# Patient Record
Sex: MALE | Race: BLACK OR AFRICAN AMERICAN | NOT HISPANIC OR LATINO | Employment: FULL TIME | URBAN - METROPOLITAN AREA
[De-identification: names, ages, dates, MRNs, and addresses within clinical notes are randomized per-mention and may not be internally consistent; named-entity substitution may affect disease eponyms.]

---

## 2019-04-08 ENCOUNTER — OFFICE VISIT (OUTPATIENT)
Dept: URGENT CARE | Facility: CLINIC | Age: 29
End: 2019-04-08
Payer: COMMERCIAL

## 2019-04-08 VITALS
WEIGHT: 280 LBS | OXYGEN SATURATION: 97 % | RESPIRATION RATE: 18 BRPM | DIASTOLIC BLOOD PRESSURE: 77 MMHG | SYSTOLIC BLOOD PRESSURE: 122 MMHG | TEMPERATURE: 99 F | BODY MASS INDEX: 41.47 KG/M2 | HEIGHT: 69 IN | HEART RATE: 71 BPM

## 2019-04-08 DIAGNOSIS — M54.2 NECK PAIN: ICD-10-CM

## 2019-04-08 DIAGNOSIS — S13.9XXA NECK SPRAIN, INITIAL ENCOUNTER: ICD-10-CM
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
## 2019-04-08 PROCEDURE — 99204 OFFICE O/P NEW MOD 45 MIN: CPT | Mod: 25,S$GLB,, | Performed by: FAMILY MEDICINE

## 2019-04-08 PROCEDURE — 96372 THER/PROPH/DIAG INJ SC/IM: CPT | Mod: S$GLB,,, | Performed by: FAMILY MEDICINE

## 2019-04-08 PROCEDURE — 99204 PR OFFICE/OUTPT VISIT, NEW, LEVL IV, 45-59 MIN: ICD-10-PCS | Mod: 25,S$GLB,, | Performed by: FAMILY MEDICINE

## 2019-04-08 PROCEDURE — 96372 PR INJECTION,THERAP/PROPH/DIAG2ST, IM OR SUBCUT: ICD-10-PCS | Mod: S$GLB,,, | Performed by: FAMILY MEDICINE

## 2019-04-08 PROCEDURE — 72040 X-RAY EXAM NECK SPINE 2-3 VW: CPT | Mod: S$GLB,,, | Performed by: RADIOLOGY

## 2019-04-08 PROCEDURE — 72040 XR CERVICAL SPINE 2 OR 3 VIEWS: ICD-10-PCS | Mod: S$GLB,,, | Performed by: RADIOLOGY

## 2019-04-08 RX ORDER — TRAMADOL HYDROCHLORIDE 50 MG/1
50 TABLET ORAL EVERY 6 HOURS PRN
Qty: 20 TABLET | Refills: 0 | Status: SHIPPED | OUTPATIENT
Start: 2019-04-08 | End: 2020-04-07

## 2019-04-08 RX ORDER — KETOROLAC TROMETHAMINE 30 MG/ML
30 INJECTION, SOLUTION INTRAMUSCULAR; INTRAVENOUS ONCE
Status: COMPLETED | OUTPATIENT
Start: 2019-04-08 | End: 2019-04-08

## 2019-04-08 RX ORDER — CHLORZOXAZONE 500 MG/1
500 TABLET ORAL 4 TIMES DAILY PRN
Qty: 40 TABLET | Refills: 0 | Status: SHIPPED | OUTPATIENT
Start: 2019-04-08 | End: 2019-04-18

## 2019-04-08 RX ORDER — NAPROXEN 500 MG/1
500 TABLET ORAL 2 TIMES DAILY WITH MEALS
Qty: 20 TABLET | Refills: 0 | Status: SHIPPED | OUTPATIENT
Start: 2019-04-08 | End: 2019-05-29 | Stop reason: ALTCHOICE

## 2019-04-08 RX ADMIN — KETOROLAC TROMETHAMINE 30 MG: 30 INJECTION, SOLUTION INTRAMUSCULAR; INTRAVENOUS at 05:04
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
## 2019-04-08 NOTE — LETTER
April 8, 2019  Woody Watson  [address] [address]                [facility]  [address], [address]  [place] [address]  Phone: [phone]  Fax: [phone] Woody Watson was seen and treated in our Urgent Care department   on 4/8/2019. He may return to work in 2 - 3 days.      If you have any questions or concerns, please don't hesitate to call.    Sincerely,        Rod Patten MD

## 2019-05-29 ENCOUNTER — OFFICE VISIT (OUTPATIENT)
Dept: URGENT CARE | Facility: CLINIC | Age: 29
End: 2019-05-29
Payer: COMMERCIAL

## 2019-05-29 VITALS
HEART RATE: 80 BPM | WEIGHT: 260 LBS | OXYGEN SATURATION: 97 % | TEMPERATURE: 99 F | SYSTOLIC BLOOD PRESSURE: 134 MMHG | RESPIRATION RATE: 20 BRPM | HEIGHT: 69 IN | DIASTOLIC BLOOD PRESSURE: 63 MMHG | BODY MASS INDEX: 38.51 KG/M2

## 2019-05-29 DIAGNOSIS — M79.671 FOOT PAIN, RIGHT: ICD-10-CM

## 2019-05-29 DIAGNOSIS — J01.90 ACUTE SINUSITIS, RECURRENCE NOT SPECIFIED, UNSPECIFIED LOCATION: Primary | ICD-10-CM

## 2019-05-29 DIAGNOSIS — M25.512 LEFT SHOULDER PAIN, UNSPECIFIED CHRONICITY: ICD-10-CM

## 2019-05-29 PROCEDURE — 73030 X-RAY EXAM OF SHOULDER: CPT | Mod: LT,S$GLB,, | Performed by: RADIOLOGY

## 2019-05-29 PROCEDURE — 73630 X-RAY EXAM OF FOOT: CPT | Mod: RT,S$GLB,, | Performed by: RADIOLOGY

## 2019-05-29 PROCEDURE — 96372 PR INJECTION,THERAP/PROPH/DIAG2ST, IM OR SUBCUT: ICD-10-PCS | Mod: S$GLB,,, | Performed by: PHYSICIAN ASSISTANT

## 2019-05-29 PROCEDURE — 96372 THER/PROPH/DIAG INJ SC/IM: CPT | Mod: S$GLB,,, | Performed by: PHYSICIAN ASSISTANT

## 2019-05-29 PROCEDURE — 3008F PR BODY MASS INDEX (BMI) DOCUMENTED: ICD-10-PCS | Mod: CPTII,S$GLB,, | Performed by: PHYSICIAN ASSISTANT

## 2019-05-29 PROCEDURE — 3008F BODY MASS INDEX DOCD: CPT | Mod: CPTII,S$GLB,, | Performed by: PHYSICIAN ASSISTANT

## 2019-05-29 PROCEDURE — 73630 XR FOOT COMPLETE 3 VIEW RIGHT: ICD-10-PCS | Mod: RT,S$GLB,, | Performed by: RADIOLOGY

## 2019-05-29 PROCEDURE — 99214 PR OFFICE/OUTPT VISIT, EST, LEVL IV, 30-39 MIN: ICD-10-PCS | Mod: 25,S$GLB,, | Performed by: PHYSICIAN ASSISTANT

## 2019-05-29 PROCEDURE — 99214 OFFICE O/P EST MOD 30 MIN: CPT | Mod: 25,S$GLB,, | Performed by: PHYSICIAN ASSISTANT

## 2019-05-29 PROCEDURE — 73030 XR SHOULDER COMPLETE 2 OR MORE VIEWS LEFT: ICD-10-PCS | Mod: LT,S$GLB,, | Performed by: RADIOLOGY

## 2019-05-29 RX ORDER — DEXAMETHASONE SODIUM PHOSPHATE 100 MG/10ML
5 INJECTION INTRAMUSCULAR; INTRAVENOUS
Status: COMPLETED | OUTPATIENT
Start: 2019-05-29 | End: 2019-05-29

## 2019-05-29 RX ORDER — AZITHROMYCIN 250 MG/1
TABLET, FILM COATED ORAL
Qty: 6 TABLET | Refills: 0 | Status: SHIPPED | OUTPATIENT
Start: 2019-05-29

## 2019-05-29 RX ORDER — ETODOLAC 400 MG/1
400 TABLET, FILM COATED ORAL 2 TIMES DAILY
Qty: 30 TABLET | Refills: 0 | Status: SHIPPED | OUTPATIENT
Start: 2019-05-29 | End: 2019-06-13

## 2019-05-29 RX ORDER — METHOCARBAMOL 500 MG/1
500 TABLET, FILM COATED ORAL 3 TIMES DAILY
Qty: 30 TABLET | Refills: 0 | Status: SHIPPED | OUTPATIENT
Start: 2019-05-29 | End: 2019-06-08

## 2019-05-29 RX ADMIN — DEXAMETHASONE SODIUM PHOSPHATE 5 MG: 100 INJECTION INTRAMUSCULAR; INTRAVENOUS at 03:05

## 2019-05-29 NOTE — LETTER
May 29, 2019      [facility]  [address], [address]  [place] [address]  Phone: [phone]  Fax: [phone]       Patient: Woody Watson   YOB: 1990  Date of Visit: 05/29/2019    To Whom It May Concern:    Raymond Watson  was at [facility] on 05/29/2019. He may return to work/school on 5/30/2019 with no restrictions. If you have any questions or concerns, or if I can be of further assistance, please do not hesitate to contact me.    Sincerely,    Eben Glaser PA-C

## 2019-05-29 NOTE — PROGRESS NOTES
"Subjective:       Patient ID: Woody Watson is a 28 y.o. male.    Vitals:  height is 5' 9" (1.753 m) and weight is 117.9 kg (260 lb). His oral temperature is 98.5 °F (36.9 °C). His blood pressure is 134/63 and his pulse is 80. His respiration is 20 and oxygen saturation is 97%.     Chief Complaint: Sinus Problem; Foot Pain (right); and Shoulder Pain (left)    Sinus Problem   This is a new problem. The current episode started in the past 7 days (x3days). The problem has been gradually worsening since onset. There has been no fever. Associated symptoms include congestion, coughing, sinus pressure and a sore throat. Pertinent negatives include no chills, diaphoresis, ear pain, headaches or shortness of breath. Past treatments include antibiotics. The treatment provided mild relief.   Foot Injury    Incident onset: months. The incident occurred at home. There was no injury mechanism. The pain is present in the right foot. The quality of the pain is described as aching. The pain is at a severity of 5/10. The pain is moderate. The pain has been constant since onset. Pertinent negatives include no inability to bear weight, numbness or tingling. He reports no foreign bodies present. The symptoms are aggravated by movement and weight bearing. He has tried elevation, ice, rest and heat for the symptoms. The treatment provided mild relief.   Shoulder Pain    The pain is present in the left shoulder. This is a new problem. The current episode started 1 to 4 weeks ago. There has been no history of extremity trauma. The problem occurs intermittently. The problem has been unchanged. The quality of the pain is described as aching. The pain is at a severity of 3/10. The pain is mild. Pertinent negatives include no fever, headaches, inability to bear weight, numbness or tingling. He has tried NSAIDS for the symptoms. The treatment provided mild relief.       Constitution: Negative for chills, sweating, fatigue and fever.   HENT: " Positive for congestion, sinus pressure and sore throat. Negative for ear pain, sinus pain and voice change.    Neck: Negative for painful lymph nodes.   Eyes: Negative for eye redness.   Respiratory: Positive for cough. Negative for chest tightness, sputum production, bloody sputum, COPD, shortness of breath, stridor, wheezing and asthma.    Gastrointestinal: Negative for nausea and vomiting.   Musculoskeletal: Positive for pain and joint pain. Negative for muscle ache.   Skin: Negative for rash.   Allergic/Immunologic: Negative for seasonal allergies and asthma.   Neurological: Negative for headaches and numbness.   Hematologic/Lymphatic: Negative for swollen lymph nodes.       Objective:      Physical Exam   Constitutional: He is oriented to person, place, and time. Vital signs are normal. He appears well-developed and well-nourished. He is cooperative.  Non-toxic appearance. He does not have a sickly appearance. He does not appear ill. No distress.   HENT:   Head: Normocephalic and atraumatic.   Right Ear: Hearing, external ear and ear canal normal. Tympanic membrane is not erythematous and not bulging. A middle ear effusion (serous) is present.   Left Ear: Hearing, external ear and ear canal normal. Tympanic membrane is not erythematous and not bulging. A middle ear effusion (serous) is present.   Nose: Mucosal edema present. No rhinorrhea or nasal deformity. No epistaxis. Right sinus exhibits no maxillary sinus tenderness and no frontal sinus tenderness. Left sinus exhibits no maxillary sinus tenderness and no frontal sinus tenderness.   Mouth/Throat: Uvula is midline and mucous membranes are normal. No trismus in the jaw. Normal dentition. No uvula swelling. Posterior oropharyngeal erythema (mild) present. No oropharyngeal exudate or posterior oropharyngeal edema.       Eyes: Pupils are equal, round, and reactive to light. Conjunctivae, EOM and lids are normal. No scleral icterus.   Sclera clear bilat   Neck:  Trachea normal, full passive range of motion without pain and phonation normal. Neck supple. No neck rigidity. No edema and no erythema present.   Cardiovascular: Normal rate, regular rhythm, normal heart sounds, intact distal pulses and normal pulses.   Pulmonary/Chest: Effort normal and breath sounds normal. No respiratory distress. He has no decreased breath sounds. He has no wheezes. He has no rhonchi. He has no rales.   Intermittent nonproductive cough   Abdominal: Soft. Normal appearance and bowel sounds are normal. He exhibits no distension. There is no tenderness.   Musculoskeletal: Normal range of motion. He exhibits no edema or deformity.        Left shoulder: He exhibits tenderness and pain. He exhibits normal range of motion, no swelling, no effusion, no crepitus, no deformity, no laceration and normal pulse.        Arms:       Left foot: There is tenderness. There is normal range of motion, no swelling, normal capillary refill, no crepitus and no deformity.        Feet:    Lymphadenopathy:     He has no cervical adenopathy.   Neurological: He is alert and oriented to person, place, and time. He has normal strength. No sensory deficit. He exhibits normal muscle tone. Coordination normal.   Skin: Skin is warm, dry and intact. He is not diaphoretic. No pallor.   Psychiatric: He has a normal mood and affect. His speech is normal and behavior is normal. Judgment and thought content normal. Cognition and memory are normal.   Nursing note and vitals reviewed.      X-ray Foot Complete Right    Result Date: 5/29/2019  EXAMINATION: XR FOOT COMPLETE 3 VIEW RIGHT CLINICAL HISTORY: . Pain in right foot TECHNIQUE: AP, lateral, and oblique views of the right foot were performed. COMPARISON: None FINDINGS: No fracture or dislocation.  No bone destruction identified     See above Electronically signed by: Gordy Nascimento MD Date:    05/29/2019 Time:    14:22    Xr Shoulder Complete 2 Or More Views Left    Result Date:  5/29/2019  EXAMINATION: XR SHOULDER COMPLETE 2 OR MORE VIEWS LEFT CLINICAL HISTORY: Pain in left shoulder TECHNIQUE: Two or three views of the left shoulder were performed. COMPARISON: None FINDINGS: No fracture or dislocation.  No bone destruction identified     See above Electronically signed by: Gordy Nascimento MD Date:    05/29/2019 Time:    14:21      Assessment:       1. Acute sinusitis, recurrence not specified, unspecified location    2. Foot pain, right    3. Left shoulder pain, unspecified chronicity        Plan:         Acute sinusitis, recurrence not specified, unspecified location  -     azithromycin (Z-LATASHA) 250 MG tablet; Take 2 tablets by mouth on day 1; Take 1 tablet by mouth on days 2-5  Dispense: 6 tablet; Refill: 0  -     dexamethasone injection 5 mg  -     pseudoephedrine-DM-guaifenesin 60- mg Tab; Take 1 tablet by mouth every 6 (six) hours as needed.  Dispense: 28 tablet; Refill: 0    Foot pain, right  -     X-Ray Foot Complete Right; Future; Expected date: 05/29/2019  -     etodolac (LODINE) 400 MG tablet; Take 1 tablet (400 mg total) by mouth 2 (two) times daily. for 15 days  Dispense: 30 tablet; Refill: 0    Left shoulder pain, unspecified chronicity  -     XR SHOULDER COMPLETE 2 OR MORE VIEWS LEFT; Future; Expected date: 05/29/2019  -     etodolac (LODINE) 400 MG tablet; Take 1 tablet (400 mg total) by mouth 2 (two) times daily. for 15 days  Dispense: 30 tablet; Refill: 0  -     methocarbamol (ROBAXIN) 500 MG Tab; Take 1 tablet (500 mg total) by mouth 3 (three) times daily. for 10 days  Dispense: 30 tablet; Refill: 0      Patient Instructions   · Follow up with your primary care in 2-5 days if symptoms have not improved, or you may return here.  · If you were referred to a specialist, please follow up with that specialty.  · If you were prescribed antibiotics, please take them to completion.  · If you were prescribed a narcotic or any medication with sedative effects, do not drive or  operate heavy equipment or machinery while taking these medications.  · You must understand that you have received treatment at an Urgent Care facility only, and that you may be released before all of your medical problems are known or treated. Urgent Care facilities are not equipped to handle life threatening emergencies. It is recommended that you go to an Emergency Department for further evaluation of worsening or concerning symptoms, or possibly life threatening conditions as discussed.                                        If you  smoke, please stop smoking      You have been given a prescription for antibiotics (AZITHROMYCIN). Your symptoms will likely resolve without antibiotics. It is recommended that you monitor yourself closely for 3-5 days and fill this prescription and start the antibiotic only if signs of infection, as discussed at your visit, are present. It is important to follow these instructions due to the problem of increasing antibiotic resistance.    Symptomatic treatment:    Alternate tylenol and motrin every 4-6 hours  salt water gargles  Cold-eeze helps to reduce the duration of sore throat symptoms  Cepachol helps to numb the discomfort  Chloroseptic spray  Nasal saline spray reduces inflammation and dryness  Warm face compresses as often as you can  Vicks vapor rub at night  Flonase OTC or Nasacort OTC  Simple foods like chicken noodle soup help  Pedialyte helps with dehydration if lacking appetite  Rest as much as you can        Sinusitis    The sinuses are air-filled spaces within the bones of the face. They connect to the inside of the nose. Sinusitis is an inflammation of the tissue lining the sinus cavity. Sinus inflammation can occur during a cold. It can also be due to allergies to pollens and other particles in the air. It can cause symptoms such as sinus congestion, headache, sore throat, facial swelling and fullness. It may also cause a low-grade fever. No infection is present,  and no antibiotic treatment is needed.  Home care  · Drink plenty of water, hot tea, and other liquids. This may help thin mucus. It also may promote sinus drainage.  · Heat may help soothe painful areas of the face. Use a towel soaked in hot water. Or,  the shower and direct the hot spray onto your face. Using a vaporizer along with a menthol rub at night may also help.   · An expectorant containing guaifenesin may help thin the mucus and promote drainage from the sinuses.  · Over-the-counter decongestants may be used unless a similar medicine was prescribed. Nasal sprays work the fastest. Use one that contains phenylephrine or oxymetazoline. First blow the nose gently. Then use the spray. Do not use these medicines more often than directed on the label or symptoms may get worse. You may also use tablets containing pseudoephedrine. Avoid products that combine ingredients, because side effects may be increased. Read labels. You can also ask the pharmacist for help. (NOTE: Persons with high blood pressure should not use decongestants. They can raise blood pressure.)  · Over-the-counter antihistamines may help if allergies contributed to your sinusitis.    · Use acetaminophen or ibuprofen to control pain, unless another pain medicine was prescribed. (If you have chronic liver or kidney disease or ever had a stomach ulcer, talk with your doctor before using these medicines. Aspirin should never be used in anyone under 18 years of age who is ill with a fever. It may cause severe liver damage.)  · Use nasal rinses or irrigation as instructed by your health care provider.  · Don't smoke. This can worsen symptoms.  Follow-up care  Follow up with your healthcare provider or our staff if you are not improving within the next week.  When to seek medical advice  Call your healthcare provider if any of these occur:  · Green or yellow discharge from the nose or into the throat  · Facial pain or headache becoming more  severe  · Stiff neck  · Unusual drowsiness or confusion  · Swelling of the forehead or eyelids  · Vision problems, including blurred or double vision  · Fever of 100.4ºF (38ºC) or higher, or as directed by your healthcare provider  · Seizure  · Breathing problems  · Symptoms not resolving within 10 days  Date Last Reviewed: 4/13/2015 © 2000-2017 [employer]. [address] [place] [address], [place] [address]. All rights reserved. This information is not intended as a substitute for professional medical care. Always follow your healthcare professional's instructions.        Tendonitis  A tendon is the thick fibrous cord that joins muscle to bone and allows joints to move. When a tendon becomes inflamed, it is called tendonitis. This can occur from overuse, injury, or infection. This usually involves the shoulders, forearm, wrist, hands and foot. Symptoms include pain, swelling and tenderness to the touch. Moving the joint increases the pain.  It takes 4 to 6 weeks for tendonitis to heal. It is treated by preventing motion of the tendon with a splint or brace and the use of anti-inflammatory medicine.  Home care  · Some people find relief with ice packs. These can be crushed or cubed ice in a plastic bag or a bag of frozen vegetables wrapped in a thin towel. Other people get better relief with heat. This can include a hot shower, hot bath, or a moist towel warmed in a microwave. Try each and use the method that feels best, for 15 to 20 minutes several times a day.  · Rest the inflamed joint and protect it from movement.  · You may use over-the-counter ibuprofen or naproxen to treat pain and inflammation, unless another medicine was prescribed. If you can't take these medicines, acetaminophen may help with the pain, but does not treat inflammation. If you have chronic liver or kidney disease or ever had a stomach ulcer or gastrointestinal bleeding, talk with your doctor before using these medicines.  · As your  symptoms improve, begin gradual motion at the involved joint.  Follow-up care  Follow up with your healthcare provider if you are not improving after 5 days of treatment.  When to seek medical advice  Call your healthcare provider right away if any of these occur:  · Redness over the painful area  · Increasing pain or swelling at the joint  · Fever (1 degree above your normal temperature) lasting 24 to 48 hours Or, whatever your healthcare provider told you to report based on your condition  Date Last Reviewed: 11/21/2015 © 2000-2017 [employer]. [address] [place] [id number]. All rights reserved. This information is not intended as a substitute for professional medical care. Always follow your healthcare professional's instructions.

## 2019-05-29 NOTE — PATIENT INSTRUCTIONS
· Follow up with your primary care in 2-5 days if symptoms have not improved, or you may return here.  · If you were referred to a specialist, please follow up with that specialty.  · If you were prescribed antibiotics, please take them to completion.  · If you were prescribed a narcotic or any medication with sedative effects, do not drive or operate heavy equipment or machinery while taking these medications.  · You must understand that you have received treatment at an Urgent Care facility only, and that you may be released before all of your medical problems are known or treated. Urgent Care facilities are not equipped to handle life threatening emergencies. It is recommended that you go to an Emergency Department for further evaluation of worsening or concerning symptoms, or possibly life threatening conditions as discussed.                                        If you  smoke, please stop smoking      You have been given a prescription for antibiotics (AZITHROMYCIN). Your symptoms will likely resolve without antibiotics. It is recommended that you monitor yourself closely for 3-5 days and fill this prescription and start the antibiotic only if signs of infection, as discussed at your visit, are present. It is important to follow these instructions due to the problem of increasing antibiotic resistance.    Symptomatic treatment:    Alternate tylenol and motrin every 4-6 hours  salt water gargles  Cold-eeze helps to reduce the duration of sore throat symptoms  Cepachol helps to numb the discomfort  Chloroseptic spray  Nasal saline spray reduces inflammation and dryness  Warm face compresses as often as you can  Vicks vapor rub at night  Flonase OTC or Nasacort OTC  Simple foods like chicken noodle soup help  Pedialyte helps with dehydration if lacking appetite  Rest as much as you can        Sinusitis    The sinuses are air-filled spaces within the bones of the face. They connect to the inside of the  nose. Sinusitis is an inflammation of the tissue lining the sinus cavity. Sinus inflammation can occur during a cold. It can also be due to allergies to pollens and other particles in the air. It can cause symptoms such as sinus congestion, headache, sore throat, facial swelling and fullness. It may also cause a low-grade fever. No infection is present, and no antibiotic treatment is needed.  Home care  · Drink plenty of water, hot tea, and other liquids. This may help thin mucus. It also may promote sinus drainage.  · Heat may help soothe painful areas of the face. Use a towel soaked in hot water. Or,  the shower and direct the hot spray onto your face. Using a vaporizer along with a menthol rub at night may also help.   · An expectorant containing guaifenesin may help thin the mucus and promote drainage from the sinuses.  · Over-the-counter decongestants may be used unless a similar medicine was prescribed. Nasal sprays work the fastest. Use one that contains phenylephrine or oxymetazoline. First blow the nose gently. Then use the spray. Do not use these medicines more often than directed on the label or symptoms may get worse. You may also use tablets containing pseudoephedrine. Avoid products that combine ingredients, because side effects may be increased. Read labels. You can also ask the pharmacist for help. (NOTE: Persons with high blood pressure should not use decongestants. They can raise blood pressure.)  · Over-the-counter antihistamines may help if allergies contributed to your sinusitis.    · Use acetaminophen or ibuprofen to control pain, unless another pain medicine was prescribed. (If you have chronic liver or kidney disease or ever had a stomach ulcer, talk with your doctor before using these medicines. Aspirin should never be used in anyone under 18 years of age who is ill with a fever. It may cause severe liver damage.)  · Use nasal rinses or irrigation as instructed by your health care  provider.  · Don't smoke. This can worsen symptoms.  Follow-up care  Follow up with your healthcare provider or our staff if you are not improving within the next week.  When to seek medical advice  Call your healthcare provider if any of these occur:  · Green or yellow discharge from the nose or into the throat  · Facial pain or headache becoming more severe  · Stiff neck  · Unusual drowsiness or confusion  · Swelling of the forehead or eyelids  · Vision problems, including blurred or double vision  · Fever of 100.4ºF (38ºC) or higher, or as directed by your healthcare provider  · Seizure  · Breathing problems  · Symptoms not resolving within 10 days  Date Last Reviewed: 4/13/2015  © 7255-5385 [employer]. [address] [place] [address], [place] [address]. All rights reserved. This information is not intended as a substitute for professional medical care. Always follow your healthcare professional's instructions.        Tendonitis  A tendon is the thick fibrous cord that joins muscle to bone and allows joints to move. When a tendon becomes inflamed, it is called tendonitis. This can occur from overuse, injury, or infection. This usually involves the shoulders, forearm, wrist, hands and foot. Symptoms include pain, swelling and tenderness to the touch. Moving the joint increases the pain.  It takes 4 to 6 weeks for tendonitis to heal. It is treated by preventing motion of the tendon with a splint or brace and the use of anti-inflammatory medicine.  Home care  · Some people find relief with ice packs. These can be crushed or cubed ice in a plastic bag or a bag of frozen vegetables wrapped in a thin towel. Other people get better relief with heat. This can include a hot shower, hot bath, or a moist towel warmed in a microwave. Try each and use the method that feels best, for 15 to 20 minutes several times a day.  · Rest the inflamed joint and protect it from movement.  · You may use over-the-counter ibuprofen or  naproxen to treat pain and inflammation, unless another medicine was prescribed. If you can't take these medicines, acetaminophen may help with the pain, but does not treat inflammation. If you have chronic liver or kidney disease or ever had a stomach ulcer or gastrointestinal bleeding, talk with your doctor before using these medicines.  · As your symptoms improve, begin gradual motion at the involved joint.  Follow-up care  Follow up with your healthcare provider if you are not improving after 5 days of treatment.  When to seek medical advice  Call your healthcare provider right away if any of these occur:  · Redness over the painful area  · Increasing pain or swelling at the joint  · Fever (1 degree above your normal temperature) lasting 24 to 48 hours Or, whatever your healthcare provider told you to report based on your condition  Date Last Reviewed: 11/21/2015  © 2010-5859 [employer]. [address] [place] [address], [place] [address]. All rights reserved. This information is not intended as a substitute for professional medical care. Always follow your healthcare professional's instructions.

## 2019-06-01 ENCOUNTER — TELEPHONE (OUTPATIENT)
Dept: URGENT CARE | Facility: CLINIC | Age: 29
End: 2019-06-01

## 2020-12-04 ENCOUNTER — OFFICE VISIT (OUTPATIENT)
Dept: URGENT CARE | Facility: CLINIC | Age: 30
End: 2020-12-04
Payer: COMMERCIAL

## 2020-12-04 VITALS
WEIGHT: 250 LBS | OXYGEN SATURATION: 97 % | TEMPERATURE: 98 F | BODY MASS INDEX: 35.79 KG/M2 | HEART RATE: 66 BPM | HEIGHT: 70 IN | RESPIRATION RATE: 18 BRPM | SYSTOLIC BLOOD PRESSURE: 143 MMHG | DIASTOLIC BLOOD PRESSURE: 92 MMHG

## 2020-12-04 DIAGNOSIS — M54.9 DORSALGIA, UNSPECIFIED: ICD-10-CM

## 2020-12-04 DIAGNOSIS — M54.50 LUMBAR PAIN: Primary | ICD-10-CM

## 2020-12-04 PROCEDURE — 99214 OFFICE O/P EST MOD 30 MIN: CPT | Mod: S$GLB,,, | Performed by: NURSE PRACTITIONER

## 2020-12-04 PROCEDURE — 3008F PR BODY MASS INDEX (BMI) DOCUMENTED: ICD-10-PCS | Mod: CPTII,S$GLB,, | Performed by: NURSE PRACTITIONER

## 2020-12-04 PROCEDURE — 72100 XR LUMBAR SPINE 2 OR 3 VIEWS: ICD-10-PCS | Mod: FY,S$GLB,, | Performed by: RADIOLOGY

## 2020-12-04 PROCEDURE — 72100 X-RAY EXAM L-S SPINE 2/3 VWS: CPT | Mod: FY,S$GLB,, | Performed by: RADIOLOGY

## 2020-12-04 PROCEDURE — 99214 PR OFFICE/OUTPT VISIT, EST, LEVL IV, 30-39 MIN: ICD-10-PCS | Mod: S$GLB,,, | Performed by: NURSE PRACTITIONER

## 2020-12-04 PROCEDURE — 3008F BODY MASS INDEX DOCD: CPT | Mod: CPTII,S$GLB,, | Performed by: NURSE PRACTITIONER

## 2020-12-04 RX ORDER — TRAMADOL HYDROCHLORIDE 50 MG/1
50 TABLET ORAL NIGHTLY
Qty: 6 TABLET | Refills: 0 | Status: SHIPPED | OUTPATIENT
Start: 2020-12-04 | End: 2020-12-10

## 2020-12-04 NOTE — PROGRESS NOTES
"Subjective:       Patient ID: Woody Watson is a 30 y.o. male.    Vitals:  height is 5' 10" (1.778 m) and weight is 113.4 kg (250 lb). His temperature is 98.2 °F (36.8 °C). His blood pressure is 143/92 (abnormal) and his pulse is 66. His respiration is 18 and oxygen saturation is 97%.     Chief Complaint: Back Pain (patient fell down stairs lumbar region)    Patient to urgent care status post fall for less than 1 flight of stairs 7 days previous has lumbar low back pain with point tenderness bilaterally to paraspinal muscles see x-ray radiology report    Back Pain  This is a new problem. Episode onset: 9 days ago fell down stairs. The pain is present in the lumbar spine. The quality of the pain is described as aching. Radiates to: pain radiating into both buttocks. The pain is at a severity of 10/10. The pain is severe. The symptoms are aggravated by sitting and standing. Stiffness is present all day. Pertinent negatives include no abdominal pain, bladder incontinence, bowel incontinence, dysuria or numbness. He has tried nothing for the symptoms. The treatment provided no relief.       Constitution: Negative for fatigue.   Gastrointestinal: Negative for abdominal pain and bowel incontinence.   Genitourinary: Negative for dysuria, urgency, bladder incontinence and hematuria.   Musculoskeletal: Positive for pain, trauma, back pain and history of spine disorder. Negative for muscle cramps.        Pain l-spine into tailbone.    Skin: Negative for rash.   Neurological: Negative for coordination disturbances, numbness and tingling.       Objective:      Physical Exam   Constitutional: He is oriented to person, place, and time. obesity  HENT:   Head: Normocephalic and atraumatic.   Nose: Nose normal.   Eyes: Pupils are equal, round, and reactive to light.   Neck: Normal range of motion.   Cardiovascular: Normal rate and normal heart sounds.   Pulmonary/Chest: Effort normal and breath sounds normal.   Abdominal: Normal " appearance.   Musculoskeletal:         General: Signs of injury present.      Comments: Status post fall from less than 1 flight of stairs; has bilateral point tenderness to L4-L5 paraspinal muscles see x-ray radiology report attached   Neurological: He is alert and oriented to person, place, and time.   Skin: Skin is warm and dry. Capillary refill takes less than 2 seconds. Psychiatric: His behavior is normal. Mood, judgment and thought content normal.         Assessment:       1. Dorsalgia, unspecified        Plan:          Patient Instructions       Good Body Mechanics for Healthcare Workers     Good posture keeps your spine and muscles balanced, protecting your disks, nerves, and vertebrae from injury.     At work, you perform many tasks every day that could cause back injury. These include repetitive lifting, prolonged standing, bending, reaching, pushing, and pulling. Protect your back by using good body mechanics to maintain the 3 natural curves of your spine.  A balanced spine  · A balanced spine is made of bones (vertebrae) and pads of cartilage (disks) arranged in 3 natural curves.  · Your neck (cervical curve) supports your head. And your middle back (thoracic curve) is supported by your rib cage.  · Your lower back (lumbar curve) carries more than its fair share, balancing your entire upper body. This extra load and the mobility of the lumbar curve make it the most susceptible to injury.  Using good body mechanics  Moving your body correctly is a skill that requires your constant attention. How well you perfect the skill can mean the difference between a fatigued or injured back and a healthy back. Below are a few tips to help you use good body mechanics:  · Hold loads close to your body to minimize the effect of their weight.  · To prevent twisting injuries, move your torso--from your shoulders to your hips--as one solid unit.  · Keep your knees bent to make your legs work harder, reducing the stress on  your back.  · Avoid quick, jerky movements.  · Tighten abdominal muscles to help support your movements.  Date Last Reviewed: 2/7/2016  © 9119-5873 [employer]. [address] [place] [address], [place] [address]. All rights reserved. This information is not intended as a substitute for professional medical care. Always follow your healthcare professional's instructions.            Dorsalgia, unspecified  -     XR LUMBAR SPINE 2 OR 3 VIEWS; Future; Expected date: 12/04/2020     Xr Lumbar Spine 2 Or 3 Views    Result Date: 12/4/2020  EXAMINATION: XR LUMBAR SPINE 2 OR 3 VIEWS CLINICAL HISTORY: Back pain or radiculopathy, trauma;  Dorsalgia, unspecified TECHNIQUE: AP and lateral views COMPARISON: Lumbar spine series 11/07/2014 FINDINGS: Slight dextrocurvature.  Lumbar lordosis is maintained.  Bones are well mineralized.  Vertebral body and intervertebral disc space heights appear relatively maintained noting minimal degenerative change.  No pars defect seen.  No displaced fracture, dislocation or significant listhesis.  No subcutaneous emphysema or radiodense retained foreign body.     No displaced fracture-dislocation identified. Electronically signed by: Brock Machuca MD Date:    12/04/2020 Time:    15:34

## 2020-12-04 NOTE — LETTER
December 4, 2020      [facility]  [address] [place] [address]  Phone: [phone]  Fax: [phone]       Patient: Woody Watson   YOB: 1990  Date of Visit: 12/04/2020    To Whom It May Concern:    Raymond Watson  was at [facility] on 12/04/2020. He may return to work/school on 12/08/2020 with no restrictions. If you have any questions or concerns, or if I can be of further assistance, please do not hesitate to contact me.    Sincerely,    Diann Land MA

## 2020-12-04 NOTE — PATIENT INSTRUCTIONS
Good Body Mechanics for Healthcare Workers     Good posture keeps your spine and muscles balanced, protecting your disks, nerves, and vertebrae from injury.     At work, you perform many tasks every day that could cause back injury. These include repetitive lifting, prolonged standing, bending, reaching, pushing, and pulling. Protect your back by using good body mechanics to maintain the 3 natural curves of your spine.  A balanced spine  · A balanced spine is made of bones (vertebrae) and pads of cartilage (disks) arranged in 3 natural curves.  · Your neck (cervical curve) supports your head. And your middle back (thoracic curve) is supported by your rib cage.  · Your lower back (lumbar curve) carries more than its fair share, balancing your entire upper body. This extra load and the mobility of the lumbar curve make it the most susceptible to injury.  Using good body mechanics  Moving your body correctly is a skill that requires your constant attention. How well you perfect the skill can mean the difference between a fatigued or injured back and a healthy back. Below are a few tips to help you use good body mechanics:  · Hold loads close to your body to minimize the effect of their weight.  · To prevent twisting injuries, move your torso--from your shoulders to your hips--as one solid unit.  · Keep your knees bent to make your legs work harder, reducing the stress on your back.  · Avoid quick, jerky movements.  · Tighten abdominal muscles to help support your movements.  Date Last Reviewed: 2/7/2016  © 3218-9481 [employer]. [address] [place] [address], [place] [address]. All rights reserved. This information is not intended as a substitute for professional medical care. Always follow your healthcare professional's instructions.